# Patient Record
Sex: MALE | URBAN - METROPOLITAN AREA
[De-identification: names, ages, dates, MRNs, and addresses within clinical notes are randomized per-mention and may not be internally consistent; named-entity substitution may affect disease eponyms.]

---

## 2021-04-28 ENCOUNTER — APPOINTMENT (RX ONLY)
Dept: URBAN - METROPOLITAN AREA CLINIC 52 | Facility: CLINIC | Age: 46
Setting detail: DERMATOLOGY
End: 2021-04-28

## 2021-04-28 VITALS — TEMPERATURE: 97.1 F

## 2021-04-28 DIAGNOSIS — L81.0 POSTINFLAMMATORY HYPERPIGMENTATION: ICD-10-CM

## 2021-04-28 DIAGNOSIS — L0391 CELLULITIS AND ABSCESS OF UNSPECIFIED SITES: ICD-10-CM | Status: INADEQUATELY CONTROLLED

## 2021-04-28 DIAGNOSIS — L0390 CELLULITIS AND ABSCESS OF UNSPECIFIED SITES: ICD-10-CM | Status: INADEQUATELY CONTROLLED

## 2021-04-28 PROBLEM — L02.91 CUTANEOUS ABSCESS, UNSPECIFIED: Status: ACTIVE | Noted: 2021-04-28

## 2021-04-28 PROBLEM — L81.9 DISORDER OF PIGMENTATION, UNSPECIFIED: Status: ACTIVE | Noted: 2021-04-28

## 2021-04-28 PROCEDURE — ? ADDITIONAL NOTES

## 2021-04-28 PROCEDURE — ? PRESCRIPTION

## 2021-04-28 PROCEDURE — 99203 OFFICE O/P NEW LOW 30 MIN: CPT

## 2021-04-28 PROCEDURE — ? DEFER

## 2021-04-28 PROCEDURE — ? COUNSELING

## 2021-04-28 RX ORDER — MUPIROCIN 20 MG/G
1 OINTMENT TOPICAL BID
Qty: 1 | Refills: 1 | Status: ERX | COMMUNITY
Start: 2021-04-28

## 2021-04-28 RX ADMIN — MUPIROCIN 1: 20 OINTMENT TOPICAL at 00:00

## 2021-04-28 ASSESSMENT — LOCATION SIMPLE DESCRIPTION DERM: LOCATION SIMPLE: RIGHT LOWER BACK

## 2021-04-28 ASSESSMENT — LOCATION ZONE DERM: LOCATION ZONE: TRUNK

## 2021-04-28 ASSESSMENT — LOCATION DETAILED DESCRIPTION DERM: LOCATION DETAILED: RIGHT INFERIOR MEDIAL MIDBACK

## 2021-04-28 NOTE — PROCEDURE: DEFER
Reason To Defer Override: excision with a general surgeon
Introduction Text (Please End With A Colon): :
Detail Level: Simple